# Patient Record
Sex: FEMALE | Race: WHITE | NOT HISPANIC OR LATINO | Employment: UNEMPLOYED | URBAN - NONMETROPOLITAN AREA
[De-identification: names, ages, dates, MRNs, and addresses within clinical notes are randomized per-mention and may not be internally consistent; named-entity substitution may affect disease eponyms.]

---

## 2022-08-11 ENCOUNTER — OFFICE VISIT (OUTPATIENT)
Dept: GASTROENTEROLOGY | Facility: CLINIC | Age: 52
End: 2022-08-11
Payer: COMMERCIAL

## 2022-08-11 VITALS
DIASTOLIC BLOOD PRESSURE: 71 MMHG | BODY MASS INDEX: 39.45 KG/M2 | HEART RATE: 79 BPM | RESPIRATION RATE: 16 BRPM | SYSTOLIC BLOOD PRESSURE: 106 MMHG | TEMPERATURE: 98.4 F | HEIGHT: 62 IN | WEIGHT: 214.4 LBS | OXYGEN SATURATION: 96 %

## 2022-08-11 DIAGNOSIS — R13.19 ESOPHAGEAL DYSPHAGIA: ICD-10-CM

## 2022-08-11 DIAGNOSIS — K21.9 GASTROESOPHAGEAL REFLUX DISEASE, UNSPECIFIED WHETHER ESOPHAGITIS PRESENT: ICD-10-CM

## 2022-08-11 DIAGNOSIS — R11.2 NAUSEA AND VOMITING, UNSPECIFIED VOMITING TYPE: ICD-10-CM

## 2022-08-11 DIAGNOSIS — R19.7 DIARRHEA, UNSPECIFIED TYPE: ICD-10-CM

## 2022-08-11 DIAGNOSIS — R14.0 BLOATING: Primary | ICD-10-CM

## 2022-08-11 DIAGNOSIS — R07.9 CHEST PAIN, UNSPECIFIED TYPE: ICD-10-CM

## 2022-08-11 PROCEDURE — 99244 OFF/OP CNSLTJ NEW/EST MOD 40: CPT | Performed by: PHYSICIAN ASSISTANT

## 2022-08-11 RX ORDER — FLUOXETINE HYDROCHLORIDE 40 MG/1
40 CAPSULE ORAL DAILY
COMMUNITY
Start: 2022-07-04

## 2022-08-11 RX ORDER — ONDANSETRON 4 MG/1
TABLET, FILM COATED ORAL
COMMUNITY
Start: 2022-07-25

## 2022-08-11 RX ORDER — SUCRALFATE 1 G/1
1 TABLET ORAL 4 TIMES DAILY
Qty: 120 TABLET | Refills: 2 | Status: SHIPPED | OUTPATIENT
Start: 2022-08-11

## 2022-08-11 RX ORDER — IBUPROFEN 200 MG
200 TABLET ORAL 3 TIMES DAILY PRN
COMMUNITY

## 2022-08-11 RX ORDER — METOPROLOL SUCCINATE 50 MG/1
50 TABLET, EXTENDED RELEASE ORAL DAILY
COMMUNITY
Start: 2022-07-27

## 2022-08-11 RX ORDER — AMLODIPINE BESYLATE 10 MG/1
10 TABLET ORAL DAILY
COMMUNITY
Start: 2022-06-02

## 2022-08-11 RX ORDER — DICYCLOMINE HCL 20 MG
20 TABLET ORAL EVERY 6 HOURS
Qty: 120 TABLET | Refills: 2 | Status: SHIPPED | OUTPATIENT
Start: 2022-08-11

## 2022-08-11 RX ORDER — PANTOPRAZOLE SODIUM 40 MG/1
TABLET, DELAYED RELEASE ORAL
COMMUNITY
Start: 2022-08-08

## 2022-08-11 RX ORDER — ALPRAZOLAM 1 MG/1
TABLET ORAL
COMMUNITY
Start: 2022-08-11

## 2022-08-11 NOTE — PROGRESS NOTES
[facility] - Outpatient Consultation  Wing Jo 46 y o  female MRN: [id number]  Encounter: [id number]          ASSESSMENT AND PLAN:      1  Bloating  2  Nausea and vomiting, unspecified vomiting type  3  Chest pain, unspecified type  4  Diarrhea, unspecified type5  Esophageal dysphagia  6  Gastroesophageal reflux disease, unspecified whether esophagitis present: new patient with multiple GI symptoms including nausea/vomiting, acid reflux, abdominal pain and diarrhea and atypical chest pain with odynophagia/dysphagia  EGD 9/20 at [facility] with antral gastritis, colonoscopy 7/21 she reports as normal other than diverticulosis  Recommend stool tests, pancreatic testing, celiac testing for diarrhea and plan for EGD given odynophagia  If stool studies negative, consider trial of questran vs colonoscopy with biopsies to rule out microscopic colitis  - dicyclomine (BENTYL) 20 mg tablet; Take 1 tablet (20 mg total) by mouth every 6 (six) hours  Dispense: 120 tablet; Refill: 2  - EGD; Future  - Clostridium difficile toxin by PCR with EIA  - Giardia antigen  - Ova and parasite examination; Future  - Pancreatic elastase, fecal  - Stool Enteric Bacterial Panel by PCR  - Calprotectin,Fecal  - dicyclomine (BENTYL) 20 mg tablet; Take 1 tablet (20 mg total) by mouth every 6 (six) hours  Dispense: 120 tablet; Refill: 2  - IgA; Future  - Tissue transglutaminase, IgA; Future  - bisacodyl (DULCOLAX) 5 mg EC tablet; Take as instructed by GI office for bowel prep for colonoscopy  Dispense: 2 tablet; Refill: 0  - polyethylene glycol (GOLYTELY) 4000 mL solution; Take as instructed by GI office for bowel prep  Dispense: 4000 mL; Refill: 0  - sucralfate (CARAFATE) 1 g tablet; Take 1 tablet (1 g total) by mouth 4 (four) times a day  Dispense: 120 tablet;  Refill: 2    ______________________________________________________________________    HPI:  Wing Jo is a pleasant 57-year-old female with a past medical history of hypertension, interstitial cystitis, history of cholecystectomy who is here as a new patient for multiple GI complaints  She does have a chronic history of bloating  She has been seen at [facility] for the symptoms in the past   She did have an EGD in September 2020 showing antral gastritis  Colonoscopy in July of 2021, unable to see the report but patient reports normal other than diverticulosis  She admits that for the past 3 months she has been experiencing odynophagia, dysphagia with chest pain with swallowing, severe acid reflux  She also admits to diffuse abdominal pain and multiple episodes of diarrhea on a daily basis  She denies any sick contacts, travel or antibiotic use lately  She denies any melena or hematochezia, denies abnormal weight loss  REVIEW OF SYSTEMS:    CONSTITUTIONAL: Denies any fever, chills, rigors, and weight loss  HEENT: No earache or tinnitus  Denies hearing loss or visual disturbances  CARDIOVASCULAR: No chest pain or palpitations  RESPIRATORY: Denies any cough, hemoptysis, shortness of breath or dyspnea on exertion  GASTROINTESTINAL: As noted in the History of Present Illness  GENITOURINARY: No problems with urination  Denies any hematuria or dysuria  NEUROLOGIC: No dizziness or vertigo, denies headaches  MUSCULOSKELETAL: Denies any muscle or joint pain  SKIN: Denies skin rashes or itching  ENDOCRINE: Denies excessive thirst  Denies intolerance to heat or cold  PSYCHOSOCIAL: Denies depression or anxiety  Denies any recent memory loss         Historical Information   Past Medical History:   Diagnosis Date    Hypertension     Interstitial cystitis      Past Surgical History:   Procedure Laterality Date    CHOLECYSTECTOMY  2022    COLONOSCOPY  09/30/2020    FOOT SURGERY Right     UPPER GASTROINTESTINAL ENDOSCOPY  07/26/2021     Social History   Social History     Substance and Sexual Activity   Alcohol Use Not Currently     Social History Substance and Sexual Activity   Drug Use Never     Social History     Tobacco Use   Smoking Status Never Smoker   Smokeless Tobacco Never Used     Family History   Problem Relation Age of Onset    Arthritis Mother     Liver disease Mother     Heart attack Father     Hypertension Brother     Hypertension Brother        Meds/Allergies       Current Outpatient Medications:     ALPRAZolam (XANAX) 1 mg tablet    amLODIPine (NORVASC) 10 mg tablet    bisacodyl (DULCOLAX) 5 mg EC tablet    dicyclomine (BENTYL) 20 mg tablet    FLUoxetine (PROzac) 40 MG capsule    ibuprofen (MOTRIN) 200 mg tablet    metoprolol succinate (TOPROL-XL) 50 mg 24 hr tablet    ondansetron (ZOFRAN) 4 mg tablet    pantoprazole (PROTONIX) 40 mg tablet    polyethylene glycol (GOLYTELY) 4000 mL solution    sucralfate (CARAFATE) 1 g tablet    Allergies   Allergen Reactions    Clarithromycin Rash    Hydrocodone-Acetaminophen Dizziness     Pass out  Dizzy, pass out      Levofloxacin Rash     Passes out      Sulfamethoxazole-Trimethoprim Rash    Amitriptyline GI Intolerance     Heart race and felt like she would pass out  Heart race and felt like she would pass out      Moxifloxacin Hcl In Nacl GI Intolerance     Passes out    Pse-Dm-Apap & Pse-Dm-Cpm-Apap GI Intolerance     Passes out, patient does not recall this reaction    Valium [Diazepam] Dizziness     Passes out           Objective     Blood pressure 106/71, pulse 79, temperature 98 4 °F (36 9 °C), temperature source Tympanic, resp  rate 16, height 5' 2" (1 575 m), weight 97 3 kg (214 lb 6 4 oz), SpO2 96 %  Body mass index is 39 21 kg/m²          PHYSICAL EXAM:      General Appearance:   Alert, cooperative, no distress   HEENT:   Normocephalic, atraumatic, anicteric      Neck:  Supple, symmetrical, trachea midline   Lungs:   Clear to auscultation bilaterally; no rales, rhonchi or wheezing; respirations unlabored    Heart[de-identified]   Regular rate and rhythm; no murmur, rub, or gallop  Abdomen:   Soft, non-tender, non-distended; normal bowel sounds; no masses, no organomegaly    Genitalia:   Deferred    Rectal:   Deferred    Extremities:  No cyanosis, clubbing or edema        Skin:  No jaundice, rashes, or lesions          Lab Results:   No visits with results within 1 Day(s) from this visit  Latest known visit with results is:   No results found for any previous visit  Radiology Results:   No results found

## 2022-08-11 NOTE — PATIENT INSTRUCTIONS
Scheduled date of EGD(as of today):10/7/2022  Physician performing EGD: Dr Quintanilla Parent  Location of EGD: [address] [place] [address]  Instructions reviewed with patient by: Lo Summers  Clearances:  N/A  Follow up appointment made

## 2022-10-03 ENCOUNTER — ANESTHESIA (OUTPATIENT)
Dept: ANESTHESIOLOGY | Facility: HOSPITAL | Age: 52
End: 2022-10-03

## 2022-10-03 ENCOUNTER — ANESTHESIA EVENT (OUTPATIENT)
Dept: ANESTHESIOLOGY | Facility: HOSPITAL | Age: 52
End: 2022-10-03

## 2022-10-03 NOTE — ANESTHESIA PREPROCEDURE EVALUATION
Procedure:  PRE-OP ONLY    EGD for GERD    ECG at [facility] reported as: NSR with possible LVH and nonspecific ST and T wave abnormality     HTN: amlodipine and metorpolol           Anesthesia Plan  ASA Score- 3     Anesthesia Type- IV sedation with anesthesia with ASA Monitors  Additional Monitors:   Airway Plan:           Plan Factors-    Chart reviewed  EKG reviewed  Existing labs reviewed  Patient summary reviewed              Induction-     Postoperative Plan-     Informed Consent-

## 2022-11-23 ENCOUNTER — TELEPHONE (OUTPATIENT)
Dept: GASTROENTEROLOGY | Facility: CLINIC | Age: 52
End: 2022-11-23

## 2022-11-23 NOTE — TELEPHONE ENCOUNTER
Ankle Sprain, with X-Ray   A sprain is an injury to the ligaments that hold the ankle joint together. There are no broken bones. Most sprains take about 4 to 6 weeks to heal. A severe sprain, where the ligament is completely torn, can take several months to recover.  Mild to moderate sprains may be treated with an elastic wrap or an in-shoe splint. This will provide support and prevent re-injury. A mild sprain may not need any additional support. A severe sprain may require surgery to repair. In some cases a cast or boot may be needed.  Home care  · Stay off your injured ankle as much as possible until you can walk on it without pain. If you have a lot of pain with walking, then crutches or a walker may be prescribed. These can be rented or purchased at many pharmacies and surgical or orthopedic supply stores. Follow your healthcare provider’s advice about when to begin bearing weight on that leg.  · Keep your leg elevated to reduce pain and swelling. When sleeping, place a pillow under your injured ankle. When sitting, support your injured ankle and leg so they are level with your waist. This is very important during the first 48 hours.  · Place an ice pack over the injured area for no more than 20 minutes. Do this every 3 to 6 hours for the first 24 to 48 hours, or as instructed. To make an ice pack, put ice cubes in a plastic bag that seals at the top. Wrap the bag in a clean, thin towel or cloth. You can place the ice pack directly over the wrap, splint, or cast. Never place an ice pack directly on your skin. As the ice melts, be careful not to get any wrap, splint, or cast wet. Keep using ice packs as needed to ease pain and swelling.    · If you have a boot, open it to apply an ice pack, unless told otherwise by your provider. Wrap the ice pack in a clean, thin towel or cloth. Never put an ice pack directly on your skin.  · After 48 hours, it may also be helpful to apply heat for no more than 20 minutes,  Left message to reschedule missed visit  several times a day. You can do this with a heating pad or warm compress. Or you may want to go back and forth between using ice and heat. Never apply heat directly to the skin. Always wrap the heating pad or warm compress in a clean, thin towel or cloth.  · You may use over-the-counter pain medicine to ease pain, unless another pain medicine was prescribed. Talk with your provider before using these medicines if you have chronic liver or kidney disease, or have ever had a stomach ulcer or GI (gastrointestinal) bleeding.  · You may return to sports after your ankle heals, when you can run without pain.  · You are at risk of getting injured again for the first 6 weeks. During that time, protect your ankle with an in-shoe splint that prevents your ankle from tilting side to side. This is very important if you do active work or play sports during that time.  Follow-up care  Any X-rays you had today don’t show any broken bones, breaks, or fractures. Bruises and sprains can sometimes hurt as much as a fracture. These injuries can take time to heal completely. If your symptoms don’t improve or they get worse, talk with your healthcare provider. You may need a repeat X-ray.  When to seek medical advice  Call your healthcare provider right away if any of these occur:  · The plaster cast or splint gets wet or soft  · The fiberglass cast or splint gets wet and does not dry for 24 hours  · The pain or swelling increases, or redness appears  · The cast has a bad smell  · Fever of 100.4 F (38 C) or higher, or as directed  · Your toes become cold, blue, numb, or tingly  · You re-injure your ankle  © 3388-7564 [employer]. [address] [place] [address], [place] [address]. All rights reserved. This information is not intended as a substitute for professional medical care. Always follow your healthcare professional's instructions.

## 2024-03-29 ENCOUNTER — APPOINTMENT (OUTPATIENT)
Dept: RADIOLOGY | Facility: CLINIC | Age: 54
End: 2024-03-29
Payer: COMMERCIAL

## 2024-03-29 ENCOUNTER — OFFICE VISIT (OUTPATIENT)
Dept: URGENT CARE | Facility: CLINIC | Age: 54
End: 2024-03-29
Payer: COMMERCIAL

## 2024-03-29 VITALS
OXYGEN SATURATION: 97 % | TEMPERATURE: 97.9 F | SYSTOLIC BLOOD PRESSURE: 119 MMHG | BODY MASS INDEX: 36.62 KG/M2 | HEIGHT: 62 IN | WEIGHT: 199 LBS | RESPIRATION RATE: 18 BRPM | DIASTOLIC BLOOD PRESSURE: 74 MMHG | HEART RATE: 74 BPM

## 2024-03-29 DIAGNOSIS — R05.1 ACUTE COUGH: ICD-10-CM

## 2024-03-29 DIAGNOSIS — J06.9 UPPER RESPIRATORY TRACT INFECTION, UNSPECIFIED TYPE: Primary | ICD-10-CM

## 2024-03-29 PROCEDURE — 71046 X-RAY EXAM CHEST 2 VIEWS: CPT

## 2024-03-29 PROCEDURE — 99213 OFFICE O/P EST LOW 20 MIN: CPT | Performed by: PHYSICIAN ASSISTANT

## 2024-03-29 RX ORDER — AMOXICILLIN AND CLAVULANATE POTASSIUM 875; 125 MG/1; MG/1
1 TABLET, FILM COATED ORAL EVERY 12 HOURS SCHEDULED
Qty: 14 TABLET | Refills: 0 | Status: SHIPPED | OUTPATIENT
Start: 2024-03-29 | End: 2024-04-05

## 2024-03-29 NOTE — PROGRESS NOTES
[facility]        NAME: Nilsa Zhao is a 53 y.o. female  : 1970    MRN: [id number]  DATE: 2024  TIME: 7:39 PM    Assessment and Plan   Upper respiratory tract infection, unspecified type [J06.9]  1. Upper respiratory tract infection, unspecified type  amoxicillin-clavulanate (AUGMENTIN) 875-125 mg per tablet      2. Acute cough  XR chest pa & lateral            Patient Instructions     Patient Instructions   Upper Respiratory Infection   WHAT YOU NEED TO KNOW:   An upper respiratory infection is also called a cold. It can affect your nose, throat, ears, and sinuses. Cold symptoms are usually worst for the first 3 to 5 days. Most people get better in 7 to 14 days. You may continue to cough for 2 to 3 weeks. Colds are caused by viruses and do not get better with antibiotics.  DISCHARGE INSTRUCTIONS:   Call your local emergency number (911 in the ) if:   You have chest pain or trouble breathing.      Return to the emergency department if:   You have a fever over 102ºF (39ºC).      Call your doctor if:   You have a low fever.    Your sore throat gets worse or you see white or yellow spots in your throat.    Your symptoms get worse after 3 to 5 days or are not better in 14 days.    You have a rash anywhere on your skin.    You have large, tender lumps in your neck.    You have thick, green, or yellow drainage from your nose.    You cough up thick yellow, green, or bloody mucus.    You have a bad earache.    You have questions or concerns about your condition or care.    Medicines:  You may need any of the following:  Decongestants  help reduce nasal congestion and help you breathe more easily. If you take decongestant pills, they may make you feel restless or cause problems with your sleep. Do not use decongestant sprays for more than a few days.    Cough suppressants  help reduce coughing. Ask your healthcare provider which type of cough medicine is best for you.     NSAIDs , such as  ibuprofen, help decrease swelling, pain, and fever. NSAIDs can cause stomach bleeding or kidney problems in certain people. If you take blood thinner medicine, always ask your healthcare provider if NSAIDs are safe for you. Always read the medicine label and follow directions.    Acetaminophen  decreases pain and fever. It is available without a doctor's order. Ask how much to take and how often to take it. Follow directions. Read the labels of all other medicines you are using to see if they also contain acetaminophen, or ask your doctor or pharmacist. Acetaminophen can cause liver damage if not taken correctly.    Take your medicine as directed.  Contact your healthcare provider if you think your medicine is not helping or if you have side effects. Tell your provider if you are allergic to any medicine. Keep a list of the medicines, vitamins, and herbs you take. Include the amounts, and when and why you take them. Bring the list or the pill bottles to follow-up visits. Carry your medicine list with you in case of an emergency.    Self-care:   Rest as much as possible.  Slowly start to do more each day.    Drink more liquids as directed.  Liquids will help thin and loosen mucus so you can cough it up. Liquids will also help prevent dehydration. Liquids that help prevent dehydration include water, fruit juice, and broth. Do not drink liquids that contain caffeine. Caffeine can increase your risk for dehydration. Ask your healthcare provider how much liquid to drink each day.    Soothe a sore throat.  Gargle with warm salt water. Make salt water by dissolving ¼ teaspoon salt in 1 cup warm water. You may also suck on hard candy or throat lozenges. You may use a sore throat spray.    Use a humidifier or vaporizer.  Use a cool mist humidifier or a vaporizer to increase air moisture in your home. This may make it easier for you to breathe and help decrease your cough.    Use saline nasal drops as directed.  These help  relieve congestion.    Apply petroleum-based jelly around the outside of your nostrils.  This can decrease irritation from blowing your nose.    Do not smoke.  Nicotine and other chemicals in cigarettes and cigars can make your symptoms worse. They can also cause infections such as bronchitis or pneumonia. Ask your healthcare provider for information if you currently smoke and need help to quit. E-cigarettes or smokeless tobacco still contain nicotine. Talk to your healthcare provider before you use these products.    Prevent a cold:   Wash your hands often.  Use soap and water every time you wash your hands. Rub your soapy hands together, lacing your fingers. Use the fingers of one hand to scrub under the nails of the other hand. Wash for at least 20 seconds. Rinse with warm, running water for several seconds. Then dry your hands. Use hand  gel if soap and water are not available. Do not touch your eyes or mouth without washing your hands first.         Cover a sneeze or cough.  Use a tissue that covers your mouth and nose. Put the used tissue in the trash right away. Use the bend of your arm if a tissue is not available. Wash your hands well with soap and water or use a hand . Do not stand close to anyone who is sneezing or coughing.    Try to stay away from others while you are sick.  This is especially important during the first 2 to 3 days when the virus is more easily spread. Wait until a fever, cough, or other symptoms are gone before you return to work or other regular activities.    Do not share items while you are sick.  This includes food, drinks, eating utensils, and dishes.    Follow up with your doctor as directed:  Write down your questions so you remember to ask them during your visits.  © Copyright [employer] 2023 Information is for End User's use only and may not be sold, redistributed or otherwise used for commercial purposes.  The above information is an  only. It  is not intended as medical advice for individual conditions or treatments. Talk to your doctor, nurse or pharmacist before following any medical regimen to see if it is safe and effective for you.        Follow up with PCP in 3-5 days.  Proceed to  ER if symptoms worsen.    Chief Complaint     Chief Complaint   Patient presents with    Generalized Body Aches    Chills    Headache    Earache    Dizziness    Cough    Chest Pain     Feels heavy and she did take a covid test and it came back negative. Symptoms started 4 days ago.     Fatigue         History of Present Illness       Patient presents the clinic for general body aches, headache and earaches, cough, chest heaviness and fatigue for the past 4 days.  She denies history of asthma or COPD.  She does not smoke.        Review of Systems   Review of Systems   Constitutional:  Negative for chills and fever.   HENT:  Positive for congestion, postnasal drip, rhinorrhea, sinus pressure, sinus pain and sneezing. Negative for ear pain and sore throat.    Eyes:  Negative for pain and visual disturbance.   Respiratory:  Positive for cough. Negative for shortness of breath, wheezing and stridor.    Cardiovascular:  Negative for chest pain and palpitations.   Gastrointestinal:  Negative for abdominal pain, diarrhea and vomiting.   Genitourinary:  Negative for dysuria and hematuria.   Musculoskeletal:  Negative for arthralgias and back pain.   Skin:  Negative for color change and rash.   Neurological:  Positive for headaches. Negative for seizures and syncope.   All other systems reviewed and are negative.        Current Medications       Current Outpatient Medications:     ALPRAZolam (XANAX) 1 mg tablet, , Disp: , Rfl:     amLODIPine (NORVASC) 10 mg tablet, Take 10 mg by mouth daily, Disp: , Rfl:     amoxicillin-clavulanate (AUGMENTIN) 875-125 mg per tablet, Take 1 tablet by mouth every 12 (twelve) hours for 7 days, Disp: 14 tablet, Rfl: 0    bisacodyl (DULCOLAX) 5 mg EC  tablet, Take as instructed by GI office for bowel prep for colonoscopy, Disp: 2 tablet, Rfl: 0    dicyclomine (BENTYL) 20 mg tablet, Take 1 tablet (20 mg total) by mouth every 6 (six) hours, Disp: 120 tablet, Rfl: 2    FLUoxetine (PROzac) 40 MG capsule, Take 40 mg by mouth daily, Disp: , Rfl:     ibuprofen (MOTRIN) 200 mg tablet, Take 200 mg by mouth Three times daily as needed, Disp: , Rfl:     metoprolol succinate (TOPROL-XL) 50 mg 24 hr tablet, Take 50 mg by mouth daily, Disp: , Rfl:     ondansetron (ZOFRAN) 4 mg tablet, TAKE 1 TABLET BY MOUTH DAILY AS NEEDED FOR NAUSEA OR VOMITING., Disp: , Rfl:     pantoprazole (PROTONIX) 40 mg tablet, , Disp: , Rfl:     polyethylene glycol (GOLYTELY) 4000 mL solution, Take as instructed by GI office for bowel prep, Disp: 4000 mL, Rfl: 0    sucralfate (CARAFATE) 1 g tablet, Take 1 tablet (1 g total) by mouth 4 (four) times a day, Disp: 120 tablet, Rfl: 2    Current Allergies     Allergies as of 03/29/2024 - Reviewed 03/29/2024   Allergen Reaction Noted    Clarithromycin Rash 03/05/2018    Hydrocodone-acetaminophen Dizziness 11/15/2012    Levofloxacin Rash 07/24/2008    Sulfamethoxazole-trimethoprim Rash 02/21/2019    Amitriptyline GI Intolerance 07/20/2009    Moxifloxacin hcl in nacl GI Intolerance 07/24/2008    Pse-dm-apap & pse-dm-cpm-apap GI Intolerance 07/24/2008    Valium [diazepam] Dizziness 08/11/2022            The following portions of the patient's history were reviewed and updated as appropriate: allergies, current medications, past family history, past medical history, past social history, past surgical history and problem list.     Past Medical History:   Diagnosis Date    Hypertension     Interstitial cystitis        Past Surgical History:   Procedure Laterality Date    CHOLECYSTECTOMY  2022    COLONOSCOPY  09/30/2020    FOOT SURGERY Right     UPPER GASTROINTESTINAL ENDOSCOPY  07/26/2021       Family History   Problem Relation Age of Onset    Arthritis Mother      "Liver disease Mother     Heart attack Father     Hypertension Brother     Hypertension Brother          Medications have been verified.        Objective   /74   Pulse 74   Temp 97.9 °F (36.6 °C)   Resp 18   Ht 5' 2\" (1.575 m)   Wt 90.3 kg (199 lb) Comment: Weight  SpO2 97%   BMI 36.40 kg/m²        Physical Exam     Physical Exam  Constitutional:       Appearance: She is well-developed. She is not diaphoretic.   HENT:      Head: Normocephalic.      Nose:      Right Turbinates: Enlarged.      Left Turbinates: Enlarged.      Right Sinus: Frontal sinus tenderness present.      Left Sinus: Frontal sinus tenderness present.      Comments: Green discharge noted in the nares with sinus tenderness     Mouth/Throat:      Pharynx: Posterior oropharyngeal erythema present.   Eyes:      General:         Right eye: No discharge.         Left eye: No discharge.      Pupils: Pupils are equal, round, and reactive to light.   Neck:      Thyroid: No thyromegaly.   Cardiovascular:      Rate and Rhythm: Normal rate.      Heart sounds: No murmur heard.  Pulmonary:      Effort: Pulmonary effort is normal. No respiratory distress.      Breath sounds: Rhonchi present. No wheezing or rales.   Chest:      Chest wall: No tenderness.   Abdominal:      General: There is no distension.      Palpations: Abdomen is soft.      Tenderness: There is no abdominal tenderness. There is no guarding or rebound.   Musculoskeletal:         General: Normal range of motion.      Cervical back: Normal range of motion.   Lymphadenopathy:      Cervical: No cervical adenopathy.   Skin:     General: Skin is warm.   Neurological:      Mental Status: She is alert and oriented to person, place, and time.               I personally interpreted the x-ray and reviewed it with the patient.  There is no acute infiltrate        "

## 2025-01-21 ENCOUNTER — OFFICE VISIT (OUTPATIENT)
Dept: URGENT CARE | Facility: CLINIC | Age: 55
End: 2025-01-21
Payer: COMMERCIAL

## 2025-01-21 VITALS
TEMPERATURE: 97.5 F | OXYGEN SATURATION: 98 % | BODY MASS INDEX: 34.04 KG/M2 | RESPIRATION RATE: 18 BRPM | HEIGHT: 62 IN | SYSTOLIC BLOOD PRESSURE: 129 MMHG | WEIGHT: 185 LBS | HEART RATE: 68 BPM | DIASTOLIC BLOOD PRESSURE: 74 MMHG

## 2025-01-21 DIAGNOSIS — J32.4 CHRONIC PANSINUSITIS: Primary | ICD-10-CM

## 2025-01-21 PROCEDURE — 99213 OFFICE O/P EST LOW 20 MIN: CPT

## 2025-01-21 RX ORDER — ATORVASTATIN CALCIUM 20 MG/1
20 TABLET, FILM COATED ORAL
COMMUNITY
Start: 2025-01-09

## 2025-01-21 RX ORDER — CETIRIZINE HCL 1 MG/ML
10 SOLUTION, ORAL ORAL DAILY
COMMUNITY
Start: 2025-01-10 | End: 2026-01-10

## 2025-01-21 RX ORDER — ONDANSETRON 4 MG/1
4 TABLET, FILM COATED ORAL EVERY 8 HOURS PRN
Qty: 9 TABLET | Refills: 0 | Status: SHIPPED | OUTPATIENT
Start: 2025-01-21 | End: 2025-01-24

## 2025-01-21 RX ORDER — DICYCLOMINE HYDROCHLORIDE 10 MG/1
CAPSULE ORAL
COMMUNITY
Start: 2025-01-01

## 2025-01-21 NOTE — PATIENT INSTRUCTIONS
Augmentin as prescribed  Zofran as needed   Tylenol Sinus over the counter  Warm compresses over sinuses  Steam treatment (practice proper safety precautions when handing hot liquids/steam)  Over the counter saline nasal spray  Follow up with PCP in 3-5 days.  Proceed to  ER if symptoms worsen.    Eat yogurt with live and active cultures and/or take a probiotic at least 3 hours before or after antibiotic dose. Monitor stool for diarrhea and/or blood. If this occurs, contact primary care doctor ASAP.        Follow up with PCP in 3-5 days.  Proceed to  ER if symptoms worsen.

## 2025-01-21 NOTE — PROGRESS NOTES
[facility]        NAME: Nilsa Zhao is a 54 y.o. female  : 1970    MRN: [id number]  DATE: 2025  TIME: 3:24 PM    Assessment and Plan   Chronic pansinusitis [J32.4]  1. Chronic pansinusitis  amoxicillin-clavulanate (AUGMENTIN) 875-125 mg per tablet    ondansetron (ZOFRAN) 4 mg tablet      Frontal and maxillary sinus tenderness to palpation    Patient Instructions   Augmentin as prescribed  Zofran as needed   Tylenol Sinus over the counter  Warm compresses over sinuses  Steam treatment (practice proper safety precautions when handing hot liquids/steam)  Over the counter saline nasal spray  Follow up with PCP in 3-5 days.  Proceed to  ER if symptoms worsen.    Eat yogurt with live and active cultures and/or take a probiotic at least 3 hours before or after antibiotic dose. Monitor stool for diarrhea and/or blood. If this occurs, contact primary care doctor ASAP.        Follow up with PCP in 3-5 days.  Proceed to  ER if symptoms worsen.    If tests are performed, our office will contact you with results only if changes need to made to the care plan discussed with you at the visit. [facility]    Chief Complaint     Chief Complaint   Patient presents with    Earache    Sinusitis     Had 2 weeks now  Tried Advil, not helping  Yellow nasal drainage  Watery eyes         History of Present Illness       Patient presents with a 2 week histoy of ear pain, sinus pain and pressure and yellow nasal drainage. She tried Advil cold an sinus without relief. Hx sinus infections.     Earache     Sinusitis  Associated symptoms include ear pain and sinus pressure.       Review of Systems   Review of Systems   HENT:  Positive for ear pain, sinus pressure and sinus pain.    All other systems reviewed and are negative.        Current Medications       Current Outpatient Medications:     ALPRAZolam (XANAX) 1 mg tablet, , Disp: , Rfl:     amLODIPine (NORVASC) 10 mg  tablet, Take 10 mg by mouth daily, Disp: , Rfl:     amoxicillin-clavulanate (AUGMENTIN) 875-125 mg per tablet, Take 1 tablet by mouth every 12 (twelve) hours for 7 days, Disp: 14 tablet, Rfl: 0    atorvastatin (LIPITOR) 20 mg tablet, Take 20 mg by mouth, Disp: , Rfl:     dicyclomine (BENTYL) 10 mg capsule, TAKE 1 CAPSULE BY MOUTH 4 TIMES A DAY BEFORE MEALS AND AT BEDTIME., Disp: , Rfl:     FLUoxetine (PROzac) 20 mg capsule, Take 1 capsule by mouth in the morning, Disp: , Rfl:     FLUoxetine (PROzac) 40 MG capsule, Take 40 mg by mouth daily, Disp: , Rfl:     ibuprofen (MOTRIN) 200 mg tablet, Take 200 mg by mouth Three times daily as needed, Disp: , Rfl:     metoprolol succinate (TOPROL-XL) 50 mg 24 hr tablet, Take 50 mg by mouth daily, Disp: , Rfl:     ondansetron (ZOFRAN) 4 mg tablet, TAKE 1 TABLET BY MOUTH DAILY AS NEEDED FOR NAUSEA OR VOMITING., Disp: , Rfl:     ondansetron (ZOFRAN) 4 mg tablet, Take 1 tablet (4 mg total) by mouth every 8 (eight) hours as needed for nausea or vomiting for up to 3 days, Disp: 9 tablet, Rfl: 0    pantoprazole (PROTONIX) 40 mg tablet, , Disp: , Rfl:     ZyrTEC Allergy 10 MG tablet, Take 10 mg by mouth daily, Disp: , Rfl:     bisacodyl (DULCOLAX) 5 mg EC tablet, Take as instructed by GI office for bowel prep for colonoscopy (Patient not taking: Reported on 1/21/2025), Disp: 2 tablet, Rfl: 0    dicyclomine (BENTYL) 20 mg tablet, Take 1 tablet (20 mg total) by mouth every 6 (six) hours (Patient not taking: Reported on 1/21/2025), Disp: 120 tablet, Rfl: 2    polyethylene glycol (GOLYTELY) 4000 mL solution, Take as instructed by GI office for bowel prep (Patient not taking: Reported on 1/21/2025), Disp: 4000 mL, Rfl: 0    sucralfate (CARAFATE) 1 g tablet, Take 1 tablet (1 g total) by mouth 4 (four) times a day (Patient not taking: Reported on 1/21/2025), Disp: 120 tablet, Rfl: 2    Current Allergies     Allergies as of 01/21/2025 - Reviewed 01/21/2025   Allergen Reaction Noted     "Clarithromycin Rash and GI Intolerance 03/05/2018    Hydrocodone-acetaminophen Dizziness and Other (See Comments) 11/15/2012    Levofloxacin Rash 07/24/2008    Sulfamethoxazole-trimethoprim Rash 02/21/2019    Tramadol GI Intolerance 12/05/2012    Diazepam Dizziness and Other (See Comments) 08/11/2022    Moxifloxacin hcl in nacl GI Intolerance 07/24/2008    Pse-dm-apap & pse-dm-cpm-apap GI Intolerance 07/24/2008    Amitriptyline GI Intolerance and Palpitations 07/20/2009            The following portions of the patient's history were reviewed and updated as appropriate: allergies, current medications, past family history, past medical history, past social history, past surgical history and problem list.     Past Medical History:   Diagnosis Date    Anxiety     GERD (gastroesophageal reflux disease)     Hypertension     IBS (irritable bowel syndrome)     Interstitial cystitis        Past Surgical History:   Procedure Laterality Date    CHOLECYSTECTOMY  2022    COLONOSCOPY  09/30/2020    FOOT SURGERY Right     UPPER GASTROINTESTINAL ENDOSCOPY  07/26/2021       Family History   Problem Relation Age of Onset    Arthritis Mother     Liver disease Mother     Heart attack Father     Hypertension Brother     Hypertension Brother          Medications have been verified.        Objective   /74   Pulse 68   Temp 97.5 °F (36.4 °C)   Resp 18   Ht 5' 2\" (1.575 m)   Wt 83.9 kg (185 lb)   SpO2 98%   BMI 33.84 kg/m²        Physical Exam     Physical Exam  Vitals and nursing note reviewed.   Constitutional:       General: She is not in acute distress.  HENT:      Head: Normocephalic.      Right Ear: Tympanic membrane and ear canal normal. No tenderness. Tympanic membrane is not erythematous, retracted or bulging.      Left Ear: Tympanic membrane and ear canal normal. No tenderness. Tympanic membrane is not erythematous, retracted or bulging.      Nose: No congestion.      Right Sinus: Maxillary sinus tenderness and " frontal sinus tenderness present.      Left Sinus: Maxillary sinus tenderness and frontal sinus tenderness present.      Mouth/Throat:      Mouth: Mucous membranes are moist.      Pharynx: No pharyngeal swelling, oropharyngeal exudate or posterior oropharyngeal erythema.      Tonsils: No tonsillar exudate.   Eyes:      Conjunctiva/sclera: Conjunctivae normal.      Pupils: Pupils are equal, round, and reactive to light.   Cardiovascular:      Rate and Rhythm: Normal rate and regular rhythm.      Heart sounds: No murmur heard.  Pulmonary:      Effort: Pulmonary effort is normal. No respiratory distress.      Breath sounds: No wheezing, rhonchi or rales.   Lymphadenopathy:      Cervical: No cervical adenopathy.   Skin:     General: Skin is warm and dry.   Neurological:      Mental Status: She is alert.